# Patient Record
Sex: MALE | Race: WHITE
[De-identification: names, ages, dates, MRNs, and addresses within clinical notes are randomized per-mention and may not be internally consistent; named-entity substitution may affect disease eponyms.]

---

## 2017-01-11 ENCOUNTER — HOSPITAL ENCOUNTER (OUTPATIENT)
Age: 42
Discharge: HOME | End: 2017-01-11
Payer: COMMERCIAL

## 2017-01-11 DIAGNOSIS — Z01.812: Primary | ICD-10-CM

## 2017-01-11 DIAGNOSIS — M51.36: ICD-10-CM

## 2017-01-11 DIAGNOSIS — M51.36: Primary | ICD-10-CM

## 2017-01-11 DIAGNOSIS — E55.9: ICD-10-CM

## 2017-01-11 DIAGNOSIS — E11.9: ICD-10-CM

## 2017-01-11 DIAGNOSIS — Z79.899: ICD-10-CM

## 2017-06-25 ENCOUNTER — HOSPITAL ENCOUNTER (EMERGENCY)
Dept: HOSPITAL 76 - ED | Age: 42
Discharge: HOME | End: 2017-06-25
Payer: COMMERCIAL

## 2017-06-25 VITALS — DIASTOLIC BLOOD PRESSURE: 90 MMHG | SYSTOLIC BLOOD PRESSURE: 131 MMHG

## 2017-06-25 DIAGNOSIS — R03.0: ICD-10-CM

## 2017-06-25 DIAGNOSIS — Z98.890: ICD-10-CM

## 2017-06-25 DIAGNOSIS — M62.830: ICD-10-CM

## 2017-06-25 DIAGNOSIS — M54.5: Primary | ICD-10-CM

## 2017-06-25 DIAGNOSIS — E11.9: ICD-10-CM

## 2017-06-25 DIAGNOSIS — Z79.84: ICD-10-CM

## 2017-06-25 PROCEDURE — 99283 EMERGENCY DEPT VISIT LOW MDM: CPT

## 2017-06-25 NOTE — ED PHYSICIAN DOCUMENTATION
PD HPI BACK PAIN





- Stated complaint


Stated Complaint: LOW BACK PX





- Chief complaint


Chief Complaint: Back Pain





- History obtained from


History obtained from: Patient, Family





- History of Present Illness


Timing - onset: Yesterday


Timing - duration: Days (2)


Timing - details: Gradual onset


Pain level max: 8


Pain level now: 8


Location: Lower, Right, Left


Quality: Pain, Spasm, Similar to prior episodes


Associated symptoms: No: Fever, Weakness, Numbness, Incontinent of urine, 

Unable to urinate, Hematuria, Incontinent of stool


Improves with: Rest


Worsened by: Movement


Contributing factors: Twisting (doing laundry)


Similar symptoms before: Diagnosis (back pain)


Recently seen: Surgery (laminectomy in the L spine several months ago)





Review of Systems


Constitutional: denies: Fever


GI: denies: Abdominal Pain, Nausea, Vomiting, Diarrhea


: denies: Incontinent


Musculoskeletal: denies: Neck pain


Neurologic: denies: Focal weakness, Numbness, Headache





PD PAST MEDICAL HISTORY





- Past Medical History


Cardiovascular: None


Respiratory: None


Neuro: None


Endocrine/Autoimmune: Type 2 diabetes


GI: None


: None


HEENT: None


Psych: Depression


Musculoskeletal: None


Derm: None





- Past Surgical History


Past Surgical History: No


General: Gastric surgery





- Present Medications


Home Medications: 


 Ambulatory Orders











 Medication  Instructions  Recorded  Confirmed


 


Metformin HCl 500 mg PO DAILY 03/15/15 06/25/17


 


Buspirone HCl 7.5 mg PO DAILY 06/25/17 06/25/17


 


Cyclobenzaprine [Flexeril] 10 mg PO TID PRN 06/25/17 06/25/17


 


Oxycodone HCl/Acetaminophen 1 - 2 each PO Q6H PRN #14 tablet 06/25/17 





[Percocet 5-325 mg Tablet]   


 


Sertraline HCl 100 mg PO DAILY 06/25/17 06/25/17


 


predniSONE [Deltasone] 0 mg PO DAILY 06/25/17 06/25/17














- Allergies


Allergies/Adverse Reactions: 


 Allergies











Allergy/AdvReac Type Severity Reaction Status Date / Time


 


No Known Drug Allergies Allergy   Verified 06/25/17 18:11














- Social History


Does the pt smoke?: No


Smoking Status: Never smoker


Does the pt drink ETOH?: No


Does the pt have substance abuse?: No





- Immunizations


Immunizations are current?: Yes





- POLST


Patient has POLST: No





PD ED PE NORMAL





- Vitals


Vital signs reviewed: Yes





- General


General: Alert and oriented X 3, No acute distress





- HEENT


HEENT: Moist mucous membranes





- Neck


Neck: Supple, no meningeal sign, No bony TTP





- Cardiac


Cardiac: RRR





- Respiratory


Respiratory: No respiratory distress, Clear bilaterally





- Abdomen


Abdomen: Soft, Non tender, Non distended





- Back


Back: Other (No midline tenderness to palpation.  There is paraspinal muscle 

spasm present bilaterally low lumbar.)





- Derm


Derm: Warm and dry





- Extremities


Extremities: Other (normal bilateral lower extremity patellar and ankle jerk 

reflexes. Normal great toe extension bilaterally)





- Neuro


Neuro: Alert and oriented X 3, No motor deficit, No sensory deficit





- Psych


Psych: Normal mood, Normal affect





Results





- Vitals


Vitals: 


 Vital Signs - 24 hr











  06/25/17 06/25/17





  17:49 19:03


 


Temperature 36.7 C 36.6 C


 


Heart Rate 88 66


 


Respiratory 16 16





Rate  


 


Blood Pressure 139/78 H 131/90 H


 


O2 Saturation 98 98








 Oxygen











O2 Source                      Room air

















PD MEDICAL DECISION MAKING





- ED course


Complexity details: re-evaluated patient, considered differential (no cauda 

equina, no spinal epidural abscess, no fracture, no aortic dissection or 

evidence of aneursym rupture), d/w patient, d/w family


ED course: 





Patient is a 41-year-old male with a history of back pain status post 

laminectomy several months ago.  No acute findings on exam today other than 

paraspinal muscle spasm and tenderness.  Given dexamethasone here.  Placed on 

pain medications and muscle relaxants.  He is feeling better, able to sit up 

and ambulate in the emergency department.  No evidence of cauda equina.  

Patient counseled regarding signs and symptoms for which I believe and urgent re

-evaluation would be necessary. Patient with good understanding of and 

agreement to plan and is comfortable going home at this time





This document was made in part using voice recognition software. While efforts 

are made to proofread this document, sound alike and grammatical errors may 

occur.





Departure





- Departure


Disposition: 01 Home, Self Care


Clinical Impression: 


Back pain


Qualifiers:


 Back pain location: low back pain Chronicity: acute Back pain laterality: 

bilateral Sciatica presence: without sciatica Qualified Code(s): M54.5 - Low 

back pain


Condition: Good


Instructions:  ED Neck Back Pain General


Follow-Up: 


Carrie Louis PA-C [Primary Care Provider] - Within 1 week


Prescriptions: 


Oxycodone HCl/Acetaminophen [Percocet 5-325 mg Tablet] 1 - 2 each PO Q6H PRN #

14 tablet


 PRN Reason: pain


Comments: 


Return if you worsen.  This should improve over the next few days.


Do not drink alcohol or drive while on narcotic pain medicine. 


Note that many narcotic pain relievers also contain tylenol/acetaminophen. 

Please ensure that your total dose of acetaminophen from all sources does not 

exceed 3 grams (3000mg) per day. 


You may constipated on this medication, take a stool softener such as "Colace" 

twice a day while you are on it. Also recommend a over-the-counter laxative 

such as senna or MiraLAX any day that you do not have a bowel movement. 


If you received narcotic pain medication in the emergency department, do not 

drive or operate machinery for the next 24 hours.








Your blood pressure was elevated today on check in to the emergency department. 

This does not mean that you have hypertension, it is a common phenomenon to 

check into the emergency department and have elevated blood pressure. I 

recommend that you see your primary care physician within the week to have it 

rechecked when you're feeling better.





Discharge Date/Time: 06/25/17 19:26

## 2017-07-06 ENCOUNTER — HOSPITAL ENCOUNTER (OUTPATIENT)
Dept: HOSPITAL 76 - LAB | Age: 42
Discharge: HOME | End: 2017-07-06
Attending: PHYSICIAN ASSISTANT
Payer: COMMERCIAL

## 2017-07-06 DIAGNOSIS — E11.9: Primary | ICD-10-CM

## 2017-07-06 DIAGNOSIS — Z79.899: ICD-10-CM

## 2017-07-06 DIAGNOSIS — E55.9: ICD-10-CM

## 2017-07-06 DIAGNOSIS — M54.9: ICD-10-CM

## 2017-07-06 LAB
ALBUMIN/GLOB SERPL: 1.4 {RATIO} (ref 1–2.2)
ANION GAP SERPL CALCULATED.4IONS-SCNC: 9 MMOL/L (ref 6–13)
BASOPHILS NFR BLD AUTO: 0 10^3/UL (ref 0–0.1)
BASOPHILS NFR BLD AUTO: 0.1 %
BILIRUB BLD-MCNC: 1.6 MG/DL (ref 0.2–1)
BUN SERPL-MCNC: 15 MG/DL (ref 6–20)
CALCIUM UR-MCNC: 9.4 MG/DL (ref 8.5–10.3)
CHLORIDE SERPL-SCNC: 98 MMOL/L (ref 101–111)
CO2 SERPL-SCNC: 29 MMOL/L (ref 21–32)
CREAT SERPLBLD-SCNC: 0.9 MG/DL (ref 0.6–1.2)
EOSINOPHIL # BLD AUTO: 0 10^3/UL (ref 0–0.7)
EOSINOPHIL NFR BLD AUTO: 0.3 %
ERYTHROCYTE [DISTWIDTH] IN BLOOD BY AUTOMATED COUNT: 13.7 % (ref 12–15)
EST. AVERAGE GLUCOSE BLD GHB EST-MCNC: 160 MG/DL (ref 70–100)
GFRSERPLBLD MDRD-ARVRAT: 92 ML/MIN/{1.73_M2} (ref 89–?)
GLOBULIN SER-MCNC: 3 G/DL (ref 2.1–4.2)
GLUCOSE SERPL-MCNC: 227 MG/DL (ref 70–100)
HBA1C BLD-MCNC: 0.93 G/DL
HCT VFR BLD AUTO: 45.8 % (ref 42–52)
HGB UR QL STRIP: 15.8 G/DL (ref 14–18)
LYMPHOCYTES # SPEC AUTO: 0.7 10^3/UL (ref 1.5–3.5)
LYMPHOCYTES NFR BLD AUTO: 6.8 %
MCH RBC QN AUTO: 29.4 PG (ref 27–31)
MCHC RBC AUTO-ENTMCNC: 34.6 G/DL (ref 32–36)
MCV RBC AUTO: 85 FL (ref 80–94)
MONOCYTES # BLD AUTO: 0.7 10^3/UL (ref 0–1)
MONOCYTES NFR BLD AUTO: 7.3 %
NEUTROPHILS # BLD AUTO: 8.4 10^3/UL (ref 1.5–6.6)
NEUTROPHILS # SNV AUTO: 9.8 X10^3/UL (ref 4.8–10.8)
NEUTROPHILS NFR BLD AUTO: 85.5 %
NRBC # BLD AUTO: 0 /100WBC
PDW BLD AUTO: 8.4 FL (ref 7.4–11.4)
POTASSIUM SERPL-SCNC: 3.9 MMOL/L (ref 3.5–5)
PROT SPEC-MCNC: 7.2 G/DL (ref 6.7–8.2)
RBC MAR: 5.38 10^6/UL (ref 4.7–6.1)
SODIUM SERPLBLD-SCNC: 136 MMOL/L (ref 135–145)
WBC # BLD: 9.8 X10^3/UL

## 2017-07-06 PROCEDURE — 36415 COLL VENOUS BLD VENIPUNCTURE: CPT

## 2017-07-06 PROCEDURE — 85025 COMPLETE CBC W/AUTO DIFF WBC: CPT

## 2017-07-06 PROCEDURE — 82306 VITAMIN D 25 HYDROXY: CPT

## 2017-07-06 PROCEDURE — 80053 COMPREHEN METABOLIC PANEL: CPT

## 2017-07-06 PROCEDURE — 85651 RBC SED RATE NONAUTOMATED: CPT

## 2017-07-06 PROCEDURE — 86140 C-REACTIVE PROTEIN: CPT

## 2017-07-06 PROCEDURE — 83036 HEMOGLOBIN GLYCOSYLATED A1C: CPT

## 2017-10-06 ENCOUNTER — HOSPITAL ENCOUNTER (OUTPATIENT)
Dept: HOSPITAL 76 - DI | Age: 42
Discharge: HOME | End: 2017-10-06
Attending: PHYSICIAN ASSISTANT
Payer: COMMERCIAL

## 2017-10-06 DIAGNOSIS — M51.36: Primary | ICD-10-CM

## 2017-10-06 PROCEDURE — 72110 X-RAY EXAM L-2 SPINE 4/>VWS: CPT

## 2017-10-06 NOTE — XRAY REPORT
COMPLETE LUMBAR SPINE:  10/06/2017

 

CLINICAL INDICATION:  Increasing back pain, history of surgery.  

 

FINDINGS:  AP, lateral, oblique, coned-down views of the lumbar spine are compared to previous films 
of 01/11/2017.  Degenerative disk disease appears stable.  Postoperative changes are noted in the pos
terior elements of L4.  There is no evidence of compression fracture or subluxation.  

 

IMPRESSION:  POSTOPERATIVE CHANGES.  STABLE DEGENERATIVE CHANGES.  NO EVIDENCE OF INTERVAL FRACTURE. 
 

 

 

 

DD:10/06/2017 9:06:15  DT: 10/06/2017 09:14  JOB #: [id number]  EXT JOB #:[id number]

## 2017-10-24 ENCOUNTER — HOSPITAL ENCOUNTER (OUTPATIENT)
Dept: HOSPITAL 76 - LAB | Age: 42
Discharge: HOME | End: 2017-10-24
Attending: PHYSICIAN ASSISTANT
Payer: COMMERCIAL

## 2017-10-24 DIAGNOSIS — Z00.00: Primary | ICD-10-CM

## 2017-10-24 DIAGNOSIS — Z79.899: ICD-10-CM

## 2017-10-24 DIAGNOSIS — M48.061: ICD-10-CM

## 2017-10-24 DIAGNOSIS — E11.9: ICD-10-CM

## 2017-10-24 DIAGNOSIS — M25.78: ICD-10-CM

## 2017-10-24 DIAGNOSIS — M51.26: ICD-10-CM

## 2017-10-24 LAB
ALBUMIN/GLOB SERPL: 1.6 {RATIO} (ref 1–2.2)
ANION GAP SERPL CALCULATED.4IONS-SCNC: 10 MMOL/L (ref 6–13)
BASOPHILS NFR BLD AUTO: 0 10^3/UL (ref 0–0.1)
BASOPHILS NFR BLD AUTO: 0.3 %
BILIRUB BLD-MCNC: 1.8 MG/DL (ref 0.2–1)
BUN SERPL-MCNC: 16 MG/DL (ref 6–20)
CALCIUM UR-MCNC: 9.4 MG/DL (ref 8.5–10.3)
CHLORIDE SERPL-SCNC: 103 MMOL/L (ref 101–111)
CHOLEST SERPL-MCNC: 210 MG/DL
CO2 SERPL-SCNC: 29 MMOL/L (ref 21–32)
CREAT SERPLBLD-SCNC: 1 MG/DL (ref 0.6–1.2)
EOSINOPHIL # BLD AUTO: 0 10^3/UL (ref 0–0.7)
EOSINOPHIL NFR BLD AUTO: 0.6 %
ERYTHROCYTE [DISTWIDTH] IN BLOOD BY AUTOMATED COUNT: 13.8 % (ref 12–15)
EST. AVERAGE GLUCOSE BLD GHB EST-MCNC: 163 MG/DL (ref 70–100)
GFRSERPLBLD MDRD-ARVRAT: 81 ML/MIN/{1.73_M2} (ref 89–?)
GLOBULIN SER-MCNC: 2.8 G/DL (ref 2.1–4.2)
GLUCOSE SERPL-MCNC: 122 MG/DL (ref 70–100)
HBA1C BLD-MCNC: 0.95 G/DL
HCT VFR BLD AUTO: 45.4 % (ref 42–52)
HDLC SERPL-MCNC: 36 MG/DL
HDLC SERPL: 5.8 {RATIO} (ref ?–5)
HGB UR QL STRIP: 15.2 G/DL (ref 14–18)
LDLC/HDLC SERPL: 4.1 {RATIO} (ref ?–3.6)
LYMPHOCYTES # SPEC AUTO: 2.5 10^3/UL (ref 1.5–3.5)
LYMPHOCYTES NFR BLD AUTO: 31 %
MCH RBC QN AUTO: 29.3 PG (ref 27–31)
MCHC RBC AUTO-ENTMCNC: 33.4 G/DL (ref 32–36)
MCV RBC AUTO: 87.9 FL (ref 80–94)
MONOCYTES # BLD AUTO: 0.6 10^3/UL (ref 0–1)
MONOCYTES NFR BLD AUTO: 8.1 %
NEUTROPHILS # BLD AUTO: 4.8 10^3/UL (ref 1.5–6.6)
NEUTROPHILS # SNV AUTO: 8 X10^3/UL (ref 4.8–10.8)
NEUTROPHILS NFR BLD AUTO: 60 %
NRBC # BLD AUTO: 0 /100WBC
PDW BLD AUTO: 8.3 FL (ref 7.4–11.4)
POTASSIUM SERPL-SCNC: 3.4 MMOL/L (ref 3.5–5)
PROT SPEC-MCNC: 7.2 G/DL (ref 6.7–8.2)
RBC MAR: 5.17 10^6/UL (ref 4.7–6.1)
SODIUM SERPLBLD-SCNC: 142 MMOL/L (ref 135–145)
TRIGL P FAST SERPL-MCNC: 127 MG/DL
VLDLC SERPL-SCNC: 25 MG/DL
WBC # BLD: 8 X10^3/UL

## 2017-10-24 PROCEDURE — 85025 COMPLETE CBC W/AUTO DIFF WBC: CPT

## 2017-10-24 PROCEDURE — 36415 COLL VENOUS BLD VENIPUNCTURE: CPT

## 2017-10-24 PROCEDURE — 80061 LIPID PANEL: CPT

## 2017-10-24 PROCEDURE — 82306 VITAMIN D 25 HYDROXY: CPT

## 2017-10-24 PROCEDURE — 80053 COMPREHEN METABOLIC PANEL: CPT

## 2017-10-24 PROCEDURE — 84443 ASSAY THYROID STIM HORMONE: CPT

## 2017-10-24 PROCEDURE — 72158 MRI LUMBAR SPINE W/O & W/DYE: CPT

## 2017-10-24 PROCEDURE — 82043 UR ALBUMIN QUANTITATIVE: CPT

## 2017-10-24 PROCEDURE — 83036 HEMOGLOBIN GLYCOSYLATED A1C: CPT

## 2017-10-24 NOTE — MRI REPORT
EXAM:

MRI LUMBAR SPINE WITHOUT AND WITH CONTRAST

 

EXAM DATE: 10/24/2017 08:16 AM.

 

CLINICAL HISTORY: Back pain, lumbar, chronic.

 

COMPARISONS: MRI of the lumbar spine without contrast 07/10/2014.

 

TECHNIQUE: Multiplanar, multisequence T1-weighted and fluid-sensitive sequences of the lumbar spine f
rom T12 to S1 before and after administration of intravenous contrast. IV contrast: 10 mL Gadavist. O
ther: None.

 

FINDINGS: 

 

There is straightening of the normal lumbar lordosis. There is a mild decrease in the height of the d
isk at L3-L4, mild to moderate at L4-L5. There is desiccation of the disk spaces of a mild degree fro
m L2-L3 through L5-S1.

 

The conus terminates at T12-L1 and is normal.

 

The kidneys are without evidence of hydronephrosis. The abdominal aorta is of normal caliber. There i
s no significant atrophy of the paraspinal musculature or the psoas musculature.

 

L2-L3: There is a small disk bulge with a left paracentral extrusion of the disk with annular tear pr
oducing a mild central canal stenosis. The facets are normal. There is mild narrowing of the left elton
ral foramen. There has been slight retraction of the extrusion.

 

L3-L4: There is a small disk osteophyte complex with annular tear abutting the sac with superimposed 
central extrusion of the disk producing a mild central canal stenosis. The facets are normal. There i
s mild to moderate right and moderate left neural foraminal narrowing.

 

L4-L5: There are surgical changes of a right hemilaminotomy. There is a broad-based disk osteophyte c
omplex with a superimposed central extrusion of the disk. It is eccentric to the right. This produces
 moderate to severe right foraminal stenosis with contact and questionable compression of the exiting
 right L4 nerve root. Recommend correlation for right L4 radicular symptoms. Previously there was mod
erate narrowing of the right neural foramen. There is adequate CSF surrounding the nerve roots of the
 cauda equina. There is minimal left and right facet arthropathy. There is mild T2 hyperintensity wit
hin the L4-L5 inner vertebral disk space and the L4-L5 endplates likely representing annular tear wit
hin the L4-L5 disk space with concomitant Modic type I endplate degenerative change.

 

L5-S1: There is a small disk bulge with annular tear abutting the sac without significant central can
al stenosis. The facets are normal. There is no significant foraminal stenosis. 

 

The postcontrast T1-weighted images exhibit enhancement within the dorsum of the L4-L5 disk space (8,
 801). There is also T1 hypointensity within the right anterior epidural space and right hemilaminoto
my defect which exhibits enhancement. This likely represents changes of brenda dural fibrosis. Recommen
d correlation. There is a small amount of enhancement within the paraspinal musculature representing 
a small amount residual inflammation.

 

IMPRESSION: 

1. There are surgical changes of a right hemilaminotomy at L4-L5 for microdiskectomy. There is now ad
equate CSF surrounding the nerve roots of the cauda equina. However, there has been progression of th
e right foraminal stenosis which is now moderate to severe and may potentially compress the exiting r
ight L4 nerve root. Recommend correlation for any right L4 radicular symptoms. There is enhancement d
emonstrated within the posterior aspect of the L4-L5 intervertebral disk space which could be seconda
ry to associated inflammation from recurrent annular tear. Residual postsurgical enhancement is possi
ble. Other etiologies such as infection would overall be somewhat less likely but not entirely exclud
ed. 

2. There is a small disk osteophyte complex at L3-L4 with annular tear abutting the sac with superimp
osed central extrusion of the disk producing a mild central canal stenosis. 

3. There is a small disk bulge with a left paracentral extrusion of the disk at L2-L3 producing a mil
d central canal stenosis. There has been slight retraction of the extrusion at this level.

 

 

Comment: The following findings are so common in adults without low back pain that while we report th
eir presence, they must be interpreted with caution and in the context of the clinical situation. (Re
vera Giron et al, Spine 2001)

 

Prevalence of findings in patients without low back pain:

Disk degeneration (any evidence): 92%

Disk desiccation/T2 signal loss: 83%

Disk height loss: 56%

Disk bulge: 64%

Disk protrusion: 32%

Annular tear/high intensity zone: 38%

 

[facility]

Referring Provider Line: [phone]

 

SITE ID: [id number]

## 2017-10-24 NOTE — MRI PRELIMINARY REPORT
Accession: [id number]

Exam: MRI LUMBAR SPINE W/WO

 

Impression: 

1. There are surgical changes of the right hemilaminotomy at L4-L5 for microdiskectomy. There is now 
adequate CSF surrounding the nerve roots of the cauda equina area however there has been progression 
of the right foraminal stenosis which is now moderate to severe and they potentially compress the exi
ting right L4 nerve root. Recommend correlation for any right L4 reticular symptoms. There is enhance
ment demonstrated within the posterior aspect of the L4-L5 intervertebral disk space which could be s
econdary to associated inflammation from recurrent annular tear. Residual postsurgical enhancement is
 possible. Other etiologies such as infection would overall be somewhat less likely but not entirely 
excluded. 

2. There is a small disk osteophyte complex at L3-L4 with annular tear abutting the sacral superimpos
ed central extrusion of the disk producing a mild central canal stenosis. 

3. There is a small disk bulge with a left paracentral extrusion of the disk at L2-L3 producing a mil
d central canal stenosis. There has been slight retraction of the extrusion at this level area

 

 

Comment: The following findings are so common in adults without low back pain that while we report th
eir presence, they must be interpreted with caution and in the context of the clinical situation. (Re
vera Giron et al, Spine 2001)

 

Prevalence of findings in patients without low back pain:

Disk degeneration (any evidence): 92%

Disk desiccation/T2 signal loss: 83%

Disk height loss: 56%

Disk bulge: 64%

Disk protrusion: 32%

Annular tear/high intensity zone: 38%

 

[facility]

 

SITE ID: [id number]

## 2018-01-29 ENCOUNTER — HOSPITAL ENCOUNTER (OUTPATIENT)
Dept: HOSPITAL 76 - LAB.R | Age: 43
Discharge: HOME | End: 2018-01-29
Attending: PHYSICIAN ASSISTANT
Payer: COMMERCIAL

## 2018-01-29 DIAGNOSIS — Z01.812: Primary | ICD-10-CM

## 2018-01-29 DIAGNOSIS — Z79.899: ICD-10-CM

## 2018-01-29 DIAGNOSIS — E11.9: ICD-10-CM

## 2018-01-29 LAB
ALBUMIN DIAFP-MCNC: 4.4 G/DL (ref 3.2–5.5)
ALBUMIN/GLOB SERPL: 1.6 {RATIO} (ref 1–2.2)
ALP SERPL-CCNC: 66 IU/L (ref 42–121)
ALT SERPL W P-5'-P-CCNC: 25 IU/L (ref 10–60)
ANION GAP SERPL CALCULATED.4IONS-SCNC: 7 MMOL/L (ref 6–13)
AST SERPL W P-5'-P-CCNC: 25 IU/L (ref 10–42)
BASOPHILS NFR BLD AUTO: 0 10^3/UL (ref 0–0.1)
BASOPHILS NFR BLD AUTO: 0.7 %
BILIRUB BLD-MCNC: 1.2 MG/DL (ref 0.2–1)
BUN SERPL-MCNC: 18 MG/DL (ref 6–20)
CALCIUM UR-MCNC: 9.2 MG/DL (ref 8.5–10.3)
CHLORIDE SERPL-SCNC: 104 MMOL/L (ref 101–111)
CO2 SERPL-SCNC: 27 MMOL/L (ref 21–32)
CREAT SERPLBLD-SCNC: 0.8 MG/DL (ref 0.6–1.2)
EOSINOPHIL # BLD AUTO: 0.1 10^3/UL (ref 0–0.7)
EOSINOPHIL NFR BLD AUTO: 1 %
ERYTHROCYTE [DISTWIDTH] IN BLOOD BY AUTOMATED COUNT: 14 % (ref 12–15)
EST. AVERAGE GLUCOSE BLD GHB EST-MCNC: 128 MG/DL (ref 70–100)
GFRSERPLBLD MDRD-ARVRAT: 105 ML/MIN/{1.73_M2} (ref 89–?)
GLOBULIN SER-MCNC: 2.8 G/DL (ref 2.1–4.2)
GLUCOSE SERPL-MCNC: 104 MG/DL (ref 70–100)
HB2 TOTAL: 17.1 G/DL
HBA1C BLD-MCNC: 0.73 G/DL
HEMOGLOBIN A1C %: 6.1 % (ref 4.6–6.2)
HGB UR QL STRIP: 15.2 G/DL (ref 14–18)
LYMPHOCYTES # SPEC AUTO: 1.2 10^3/UL (ref 1.5–3.5)
LYMPHOCYTES NFR BLD AUTO: 19.1 %
MCH RBC QN AUTO: 29.3 PG (ref 27–31)
MCHC RBC AUTO-ENTMCNC: 35.1 G/DL (ref 32–36)
MCV RBC AUTO: 83.5 FL (ref 80–94)
MONOCYTES # BLD AUTO: 0.5 10^3/UL (ref 0–1)
MONOCYTES NFR BLD AUTO: 7.3 %
NEUTROPHILS # BLD AUTO: 4.6 10^3/UL (ref 1.5–6.6)
NEUTROPHILS # SNV AUTO: 6.5 X10^3/UL (ref 4.8–10.8)
NEUTROPHILS NFR BLD AUTO: 71.9 %
PDW BLD AUTO: 8.6 FL (ref 7.4–11.4)
PLATELET # BLD: 185 10^3/UL (ref 130–450)
PROT SPEC-MCNC: 7.2 G/DL (ref 6.7–8.2)
RBC MAR: 5.19 10^6/UL (ref 4.7–6.1)
SODIUM SERPLBLD-SCNC: 138 MMOL/L (ref 135–145)

## 2018-01-29 PROCEDURE — 80053 COMPREHEN METABOLIC PANEL: CPT

## 2018-01-29 PROCEDURE — 85025 COMPLETE CBC W/AUTO DIFF WBC: CPT

## 2018-01-29 PROCEDURE — 87640 STAPH A DNA AMP PROBE: CPT

## 2018-01-29 PROCEDURE — 83036 HEMOGLOBIN GLYCOSYLATED A1C: CPT

## 2018-04-02 ENCOUNTER — HOSPITAL ENCOUNTER (OUTPATIENT)
Dept: HOSPITAL 76 - DI | Age: 43
Discharge: HOME | End: 2018-04-02
Attending: PHYSICIAN ASSISTANT
Payer: COMMERCIAL

## 2018-04-02 DIAGNOSIS — R51: ICD-10-CM

## 2018-04-02 DIAGNOSIS — G93.9: Primary | ICD-10-CM

## 2018-04-02 PROCEDURE — 70544 MR ANGIOGRAPHY HEAD W/O DYE: CPT

## 2018-04-02 PROCEDURE — 70551 MRI BRAIN STEM W/O DYE: CPT

## 2018-04-02 NOTE — MRI REPORT
EXAM:

MRI BRAIN WITHOUT CONTRAST

 

EXAM DATE: 4/2/2018 02:09 PM.

 

CLINICAL HISTORY: Headache.

 

COMPARISON: None. MRA of the head 04/02/2018.

 

TECHNIQUE: Multiplanar, multisequence T1-weighted and fluid-sensitive MR sequences of the brain were 
performed. Sequences optimized for routine evaluation. Other: None. IV Contrast: None.

 

FINDINGS:

Brain Volume: Normal for age.

 

Parenchyma/Dura: No mass, acute infarct or hemorrhage. Lobular septated 10 x 4 mm cystic region is se
en within the subependymal region lateral and superior lateral to the frontal horn of the left latera
l ventricle. This is peripheral to the head of the left caudate nucleus. No white matter lesions iden
tified. 

 

Ventricles/Cisterns: No hydrocephalus. No abnormal extra-axial fluid collection or hemorrhage.

 

Orbits: Symmetric and unremarkable.

 

Sella Turcica: The pituitary gland, cavernous sinuses, suprasellar cistern and optic chiasm are unrem
arkable.

 

IAC: Symmetric and unremarkable.

 

Vasculature: Normal signal flow void is seen in the major arterial structures at the skull base.

 

Sinuses: No acute appearing sinus disease.

 

Bones: No focal pathologic appearing marrow signal changes.

 

Other: None.

 

IMPRESSION: 

1. Lobular 10 x 4 mm septated cystic focus within a subependymal location about the frontal horn of t
he left lateral ventricle. This is peripheral to the anterior head of the caudate nucleus. This likel
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 Follow-up study
 in 3-6 months may be of value to assess stability.

 

2. No acute intracranial abnormality. No acute infarct, mass or hemorrhage.

 

[facility]

Referring Provider Line: [phone]

 

SITE ID: [id number]

## 2018-04-02 NOTE — MRI REPORT
EXAM

MRA BRAIN 

 

EXAM DATE: 4/2/2018 02:09 PM.

 

CLINICAL HISTORY: HEADACHE..

 

COMPARISON: None. MRI of the brain 04/02/2018.

 

TECHNIQUE: Multiplanar, multisequence MRA sequences of the brain were performed. Other: None. Post-pr
ocessing: Multiplanar 3D MIP reconstructions. IV Contrast: None.

 

FINDINGS:

RIGHT

Internal Carotid (ICA): No aneurysm, stenosis or anomaly.

Middle Cerebral (MCA): No aneurysm, stenosis or anomaly.

Anterior Cerebral (CLARICE): No aneurysm, stenosis or anomaly.

Posterior Cerebral (PCA): No aneurysm, stenosis or anomaly.

Posterior Communicating (P-COM): Not visualized.

 

Vertebral: No aneurysm, stenosis or anomaly in the visualized upper vertebral artery. The PICA is unr
emarkable.

 

LEFT

Internal Carotid (ICA): No aneurysm, stenosis or anomaly.

Middle Cerebral (MCA): No aneurysm, stenosis or anomaly.

Anterior Cerebral (CLARICE): No aneurysm, stenosis or anomaly.

Posterior Cerebral (PCA): No aneurysm, stenosis or anomaly.

Posterior Communicating (P-COM): Not visualized.

 

Vertebral: No aneurysm, stenosis or anomaly in the visualized upper vertebral artery. The PICA is unr
emarkable.

 

MIDLINE

Anterior Communicating (A-COM): No aneurysm, stenosis or anomaly.

Basilar Artery:No aneurysm, stenosis or anomaly.

 

Other: None.

 

IMPRESSION: 

1. Normal brain MRA. No stenoses or aneurysms.

 

[facility]

Referring Provider Line: [phone]

 

SITE ID: [id number]

## 2018-11-08 ENCOUNTER — HOSPITAL ENCOUNTER (OUTPATIENT)
Dept: HOSPITAL 76 - LAB | Age: 43
Discharge: HOME | End: 2018-11-08
Attending: PHYSICIAN ASSISTANT
Payer: COMMERCIAL

## 2018-11-08 DIAGNOSIS — M54.5: ICD-10-CM

## 2018-11-08 DIAGNOSIS — E55.9: ICD-10-CM

## 2018-11-08 DIAGNOSIS — Z00.00: Primary | ICD-10-CM

## 2018-11-08 DIAGNOSIS — R35.0: ICD-10-CM

## 2018-11-08 DIAGNOSIS — Z79.899: ICD-10-CM

## 2018-11-08 DIAGNOSIS — E11.9: ICD-10-CM

## 2018-11-08 LAB
ALBUMIN DIAFP-MCNC: 4.5 G/DL (ref 3.2–5.5)
ALBUMIN/GLOB SERPL: 1.6 {RATIO} (ref 1–2.2)
ALP SERPL-CCNC: 89 IU/L (ref 42–121)
ALT SERPL W P-5'-P-CCNC: 23 IU/L (ref 10–60)
ANION GAP SERPL CALCULATED.4IONS-SCNC: 9 MMOL/L (ref 6–13)
AST SERPL W P-5'-P-CCNC: 26 IU/L (ref 10–42)
BASOPHILS NFR BLD AUTO: 0 10^3/UL (ref 0–0.1)
BASOPHILS NFR BLD AUTO: 0.4 %
BILIRUB BLD-MCNC: 2.1 MG/DL (ref 0.2–1)
BILIRUB UR QL CFM: NEGATIVE
BUN SERPL-MCNC: 14 MG/DL (ref 6–20)
CALCIUM UR-MCNC: 9.3 MG/DL (ref 8.5–10.3)
CASTS URNS QL MICRO: (no result) /LPF
CHLORIDE SERPL-SCNC: 100 MMOL/L (ref 101–111)
CHOLEST SERPL-MCNC: 226 MG/DL
CLARITY UR REFRACT.AUTO: CLEAR
CO2 SERPL-SCNC: 28 MMOL/L (ref 21–32)
CREAT SERPLBLD-SCNC: 0.7 MG/DL (ref 0.6–1.2)
CRP SERPL-MCNC: < 1 MG/DL (ref 0–1)
EOSINOPHIL # BLD AUTO: 0.1 10^3/UL (ref 0–0.7)
EOSINOPHIL NFR BLD AUTO: 1.2 %
ERYTHROCYTE [DISTWIDTH] IN BLOOD BY AUTOMATED COUNT: 13.7 % (ref 12–15)
EST. AVERAGE GLUCOSE BLD GHB EST-MCNC: 163 MG/DL (ref 70–100)
GFRSERPLBLD MDRD-ARVRAT: 122 ML/MIN/{1.73_M2} (ref 89–?)
GLOBULIN SER-MCNC: 2.9 G/DL (ref 2.1–4.2)
GLUCOSE SERPL-MCNC: 177 MG/DL (ref 70–100)
GLUCOSE UR QL STRIP.AUTO: 100 MG/DL
HB2 TOTAL: 17 G/DL
HBA1C BLD-MCNC: 0.96 G/DL
HDLC SERPL-MCNC: 34 MG/DL
HDLC SERPL: 6.6 {RATIO} (ref ?–5)
HEMOGLOBIN A1C %: 7.3 % (ref 4.6–6.2)
HGB UR QL STRIP: 15 G/DL (ref 14–18)
KETONES UR QL STRIP.AUTO: (no result) MG/DL
LDLC SERPL CALC-MCNC: 152 MG/DL
LDLC/HDLC SERPL: 4.5 {RATIO} (ref ?–3.6)
LYMPHOCYTES # SPEC AUTO: 1.1 10^3/UL (ref 1.5–3.5)
LYMPHOCYTES NFR BLD AUTO: 22.2 %
MCH RBC QN AUTO: 30.3 PG (ref 27–31)
MCHC RBC AUTO-ENTMCNC: 35.1 G/DL (ref 32–36)
MCV RBC AUTO: 86.4 FL (ref 80–94)
MONOCYTES # BLD AUTO: 0.4 10^3/UL (ref 0–1)
MONOCYTES NFR BLD AUTO: 8.3 %
MUCOUS THREADS URNS QL MICRO: (no result)
NEUTROPHILS # BLD AUTO: 3.3 10^3/UL (ref 1.5–6.6)
NEUTROPHILS # SNV AUTO: 4.9 X10^3/UL (ref 4.8–10.8)
NEUTROPHILS NFR BLD AUTO: 67.9 %
NITRITE UR QL STRIP.AUTO: NEGATIVE
PDW BLD AUTO: 8.1 FL (ref 7.4–11.4)
PH UR STRIP.AUTO: 5.5 PH (ref 5–7.5)
PLATELET # BLD: 197 10^3/UL (ref 130–450)
PROT SPEC-MCNC: 7.4 G/DL (ref 6.7–8.2)
PROT UR STRIP.AUTO-MCNC: 100 MG/DL
RBC # UR STRIP.AUTO: NEGATIVE /UL
RBC # URNS HPF: (no result) /HPF (ref 0–5)
RBC MAR: 4.95 10^6/UL (ref 4.7–6.1)
SODIUM SERPLBLD-SCNC: 137 MMOL/L (ref 135–145)
SP GR UR STRIP.AUTO: >=1.03 (ref 1–1.03)
SQUAMOUS URNS QL MICRO: (no result)
STARCH GRANULES URNS QL MICRO: PRESENT
UROBILINOGEN UR QL STRIP.AUTO: (no result) E.U./DL
UROBILINOGEN UR STRIP.AUTO-MCNC: NEGATIVE MG/DL
VLDLC SERPL-SCNC: 40 MG/DL

## 2018-11-08 PROCEDURE — 84443 ASSAY THYROID STIM HORMONE: CPT

## 2018-11-08 PROCEDURE — 36415 COLL VENOUS BLD VENIPUNCTURE: CPT

## 2018-11-08 PROCEDURE — 87086 URINE CULTURE/COLONY COUNT: CPT

## 2018-11-08 PROCEDURE — 81001 URINALYSIS AUTO W/SCOPE: CPT

## 2018-11-08 PROCEDURE — 83036 HEMOGLOBIN GLYCOSYLATED A1C: CPT

## 2018-11-08 PROCEDURE — 86140 C-REACTIVE PROTEIN: CPT

## 2018-11-08 PROCEDURE — 85025 COMPLETE CBC W/AUTO DIFF WBC: CPT

## 2018-11-08 PROCEDURE — 82043 UR ALBUMIN QUANTITATIVE: CPT

## 2018-11-08 PROCEDURE — 80053 COMPREHEN METABOLIC PANEL: CPT

## 2018-11-08 PROCEDURE — 81003 URINALYSIS AUTO W/O SCOPE: CPT

## 2018-11-08 PROCEDURE — 80061 LIPID PANEL: CPT

## 2018-11-08 PROCEDURE — 82306 VITAMIN D 25 HYDROXY: CPT

## 2018-11-08 PROCEDURE — 83721 ASSAY OF BLOOD LIPOPROTEIN: CPT

## 2018-11-08 PROCEDURE — 85651 RBC SED RATE NONAUTOMATED: CPT

## 2019-01-10 ENCOUNTER — HOSPITAL ENCOUNTER (OUTPATIENT)
Dept: HOSPITAL 76 - LAB.R | Age: 44
Discharge: HOME | End: 2019-01-10
Attending: PHYSICIAN ASSISTANT
Payer: COMMERCIAL

## 2019-01-10 DIAGNOSIS — Z79.899: ICD-10-CM

## 2019-01-10 DIAGNOSIS — E11.9: Primary | ICD-10-CM

## 2019-01-10 LAB
EST. AVERAGE GLUCOSE BLD GHB EST-MCNC: 137 MG/DL (ref 70–100)
HB2 TOTAL: 16.1 G/DL
HBA1C BLD-MCNC: 0.74 G/DL
HEMOGLOBIN A1C %: 6.4 % (ref 4.6–6.2)

## 2019-01-10 PROCEDURE — 83036 HEMOGLOBIN GLYCOSYLATED A1C: CPT

## 2019-03-01 ENCOUNTER — HOSPITAL ENCOUNTER (OUTPATIENT)
Dept: HOSPITAL 76 - LAB.R | Age: 44
Discharge: HOME | End: 2019-03-01
Attending: FAMILY MEDICINE
Payer: COMMERCIAL

## 2019-03-01 DIAGNOSIS — J11.1: Primary | ICD-10-CM

## 2019-03-01 PROCEDURE — 87276 INFLUENZA A AG IF: CPT

## 2019-03-01 PROCEDURE — 87275 INFLUENZA B AG IF: CPT

## 2020-06-15 ENCOUNTER — HOSPITAL ENCOUNTER (OUTPATIENT)
Dept: HOSPITAL 76 - LAB | Age: 45
Discharge: HOME | End: 2020-06-15
Attending: ORTHOPAEDIC SURGERY
Payer: COMMERCIAL

## 2020-06-15 DIAGNOSIS — Z01.818: Primary | ICD-10-CM

## 2020-06-15 LAB
BUN SERPL-MCNC: 12 MG/DL (ref 6–20)
CREAT SERPLBLD-SCNC: 0.8 MG/DL (ref 0.6–1.2)

## 2020-06-15 PROCEDURE — 84520 ASSAY OF UREA NITROGEN: CPT

## 2020-06-15 PROCEDURE — 82565 ASSAY OF CREATININE: CPT

## 2020-06-15 PROCEDURE — 36415 COLL VENOUS BLD VENIPUNCTURE: CPT

## 2020-10-28 ENCOUNTER — HOSPITAL ENCOUNTER (OUTPATIENT)
Dept: HOSPITAL 76 - LAB.WCP | Age: 45
Discharge: HOME | End: 2020-10-28
Attending: FAMILY MEDICINE
Payer: COMMERCIAL

## 2020-10-28 DIAGNOSIS — E11.9: Primary | ICD-10-CM

## 2020-10-28 LAB
ALBUMIN DIAFP-MCNC: 4.6 G/DL (ref 3.2–5.5)
ALBUMIN/GLOB SERPL: 1.6 {RATIO} (ref 1–2.2)
ALP SERPL-CCNC: 56 IU/L (ref 42–121)
ALT SERPL W P-5'-P-CCNC: 21 IU/L (ref 10–60)
ANION GAP SERPL CALCULATED.4IONS-SCNC: 10 MMOL/L (ref 6–13)
AST SERPL W P-5'-P-CCNC: 24 IU/L (ref 10–42)
BASOPHILS NFR BLD AUTO: 0 10^3/UL (ref 0–0.1)
BASOPHILS NFR BLD AUTO: 0.4 %
BILIRUB BLD-MCNC: 1.5 MG/DL (ref 0.2–1)
BUN SERPL-MCNC: 11 MG/DL (ref 6–20)
CALCIUM UR-MCNC: 10.2 MG/DL (ref 8.5–10.3)
CHLORIDE SERPL-SCNC: 105 MMOL/L (ref 101–111)
CHOLEST SERPL-MCNC: 173 MG/DL
CO2 SERPL-SCNC: 27 MMOL/L (ref 21–32)
CREAT SERPLBLD-SCNC: 0.8 MG/DL (ref 0.6–1.2)
CREAT UR-SCNC: 359.8 MG/DL
EOSINOPHIL # BLD AUTO: 0.1 10^3/UL (ref 0–0.7)
EOSINOPHIL NFR BLD AUTO: 1 %
ERYTHROCYTE [DISTWIDTH] IN BLOOD BY AUTOMATED COUNT: 12.8 % (ref 12–15)
GLOBULIN SER-MCNC: 2.9 G/DL (ref 2.1–4.2)
GLUCOSE SERPL-MCNC: 118 MG/DL (ref 70–100)
HBA1C MFR BLD HPLC: 5.7 % (ref 4.27–6.07)
HDLC SERPL-MCNC: 40 MG/DL
HDLC SERPL: 4.3 {RATIO} (ref ?–5)
HGB UR QL STRIP: 14.5 G/DL (ref 14–18)
LDLC SERPL CALC-MCNC: 115 MG/DL
LDLC/HDLC SERPL: 2.9 {RATIO} (ref ?–3.6)
LYMPHOCYTES # SPEC AUTO: 1.5 10^3/UL (ref 1.5–3.5)
LYMPHOCYTES NFR BLD AUTO: 28 %
MCH RBC QN AUTO: 30.6 PG (ref 27–31)
MCHC RBC AUTO-ENTMCNC: 34.5 G/DL (ref 32–36)
MCV RBC AUTO: 88.6 FL (ref 80–94)
MICROALBUMIN UR-MCNC: 32.9 MG/DL (ref 0–300)
MICROALBUMIN/CREAT RATIO PNL UR: 91.4 UG/MG (ref ?–30)
MONOCYTES # BLD AUTO: 0.4 10^3/UL (ref 0–1)
MONOCYTES NFR BLD AUTO: 7.7 %
NEUTROPHILS # BLD AUTO: 3.3 10^3/UL (ref 1.5–6.6)
NEUTROPHILS # SNV AUTO: 5.2 X10^3/UL (ref 4.8–10.8)
NEUTROPHILS NFR BLD AUTO: 62.7 %
PDW BLD AUTO: 10.9 FL (ref 7.4–11.4)
PLATELET # BLD: 218 10^3/UL (ref 130–450)
PROT SPEC-MCNC: 7.5 G/DL (ref 6.7–8.2)
RBC MAR: 4.74 10^6/UL (ref 4.7–6.1)
SODIUM SERPLBLD-SCNC: 142 MMOL/L (ref 135–145)
VIT B12 SERPL-MCNC: 221 PG/ML (ref 180–914)
VLDLC SERPL-SCNC: 18 MG/DL

## 2020-10-28 PROCEDURE — 83721 ASSAY OF BLOOD LIPOPROTEIN: CPT

## 2020-10-28 PROCEDURE — 80053 COMPREHEN METABOLIC PANEL: CPT

## 2020-10-28 PROCEDURE — 83036 HEMOGLOBIN GLYCOSYLATED A1C: CPT

## 2020-10-28 PROCEDURE — 84443 ASSAY THYROID STIM HORMONE: CPT

## 2020-10-28 PROCEDURE — 80061 LIPID PANEL: CPT

## 2020-10-28 PROCEDURE — 82570 ASSAY OF URINE CREATININE: CPT

## 2020-10-28 PROCEDURE — 85025 COMPLETE CBC W/AUTO DIFF WBC: CPT

## 2020-10-28 PROCEDURE — 82043 UR ALBUMIN QUANTITATIVE: CPT

## 2020-10-28 PROCEDURE — 82607 VITAMIN B-12: CPT

## 2020-10-28 PROCEDURE — 36415 COLL VENOUS BLD VENIPUNCTURE: CPT

## 2020-11-06 ENCOUNTER — HOSPITAL ENCOUNTER (OUTPATIENT)
Dept: HOSPITAL 76 - DI | Age: 45
Discharge: HOME | End: 2020-11-06
Attending: FAMILY MEDICINE
Payer: COMMERCIAL

## 2020-11-06 DIAGNOSIS — E11.9: Primary | ICD-10-CM

## 2020-11-06 PROCEDURE — 93922 UPR/L XTREMITY ART 2 LEVELS: CPT

## 2020-11-06 NOTE — ULTRASOUND REPORT
PROCEDURE:  Ankle Brachial Index

 

INDICATIONS:  DIABETES MELLITUS

 

TECHNIQUE:  Ankle-brachial indices were obtained bilaterally and recorded.  

 

COMPARISONS:  None.

 

FINDINGS:  

Right ankle brachial index (EPIFANIO):  1.3

 

Left ankle brachial index (EPIFANIO):  1.2

 

 

Healing potential:  

Ankle pressures >55 mm Hg in non-diabetics and >80 mm Hg in diabetics are likely to achieve primary h
ealing of ischemic foot ulcers.  

Toe pressures >30 mm Hg are likely to achieve primary healing of ischemic foot ulcers, toe or transme
tatarsal amputations.  

 

IMPRESSION:  

Normal ankle-brachial indices.

 

Reviewed by: Kelly Recinos MD on 11/6/2020 9:54 AM PST

Approved by: Kelly Recinos MD on 11/6/2020 9:54 AM PST

 

 

Station ID:  [id number]

## 2021-02-17 ENCOUNTER — HOSPITAL ENCOUNTER (OUTPATIENT)
Dept: HOSPITAL 76 - LAB.N | Age: 46
Discharge: HOME | End: 2021-02-17
Attending: NURSE PRACTITIONER
Payer: COMMERCIAL

## 2021-02-17 DIAGNOSIS — R30.0: Primary | ICD-10-CM

## 2021-02-17 PROCEDURE — 87086 URINE CULTURE/COLONY COUNT: CPT

## 2021-02-18 ENCOUNTER — HOSPITAL ENCOUNTER (OUTPATIENT)
Dept: HOSPITAL 76 - LAB.WCP | Age: 46
End: 2021-02-18
Attending: FAMILY MEDICINE
Payer: COMMERCIAL

## 2021-02-18 DIAGNOSIS — N41.9: ICD-10-CM

## 2021-02-18 DIAGNOSIS — E11.9: Primary | ICD-10-CM

## 2021-02-18 LAB
ALBUMIN DIAFP-MCNC: 4.6 G/DL (ref 3.2–5.5)
ALBUMIN/GLOB SERPL: 1.8 {RATIO} (ref 1–2.2)
ALP SERPL-CCNC: 54 IU/L (ref 42–121)
ALT SERPL W P-5'-P-CCNC: 17 IU/L (ref 10–60)
ANION GAP SERPL CALCULATED.4IONS-SCNC: 10 MMOL/L (ref 6–13)
AST SERPL W P-5'-P-CCNC: 19 IU/L (ref 10–42)
BASOPHILS NFR BLD AUTO: 0 10^3/UL (ref 0–0.1)
BASOPHILS NFR BLD AUTO: 0.4 %
BILIRUB BLD-MCNC: 1.8 MG/DL (ref 0.2–1)
BUN SERPL-MCNC: 15 MG/DL (ref 6–20)
CALCIUM UR-MCNC: 9.6 MG/DL (ref 8.5–10.3)
CHLORIDE SERPL-SCNC: 100 MMOL/L (ref 101–111)
CHOLEST SERPL-MCNC: 187 MG/DL
CO2 SERPL-SCNC: 26 MMOL/L (ref 21–32)
CREAT SERPLBLD-SCNC: 1 MG/DL (ref 0.6–1.2)
EOSINOPHIL # BLD AUTO: 0 10^3/UL (ref 0–0.7)
EOSINOPHIL NFR BLD AUTO: 0.4 %
ERYTHROCYTE [DISTWIDTH] IN BLOOD BY AUTOMATED COUNT: 12.5 % (ref 12–15)
GLOBULIN SER-MCNC: 2.5 G/DL (ref 2.1–4.2)
GLUCOSE SERPL-MCNC: 120 MG/DL (ref 70–100)
HBA1C MFR BLD HPLC: 5.7 % (ref 4.27–6.07)
HDLC SERPL-MCNC: 42 MG/DL
HDLC SERPL: 4.5 {RATIO} (ref ?–5)
HGB UR QL STRIP: 14.7 G/DL (ref 14–18)
LDLC SERPL CALC-MCNC: 129 MG/DL
LDLC/HDLC SERPL: 3.1 {RATIO} (ref ?–3.6)
LYMPHOCYTES # SPEC AUTO: 1.4 10^3/UL (ref 1.5–3.5)
LYMPHOCYTES NFR BLD AUTO: 28.7 %
MCH RBC QN AUTO: 30.9 PG (ref 27–31)
MCHC RBC AUTO-ENTMCNC: 34.3 G/DL (ref 32–36)
MCV RBC AUTO: 90.3 FL (ref 80–94)
MONOCYTES # BLD AUTO: 0.4 10^3/UL (ref 0–1)
MONOCYTES NFR BLD AUTO: 8.2 %
NEUTROPHILS # BLD AUTO: 3.1 10^3/UL (ref 1.5–6.6)
NEUTROPHILS # SNV AUTO: 5 X10^3/UL (ref 4.8–10.8)
NEUTROPHILS NFR BLD AUTO: 62.1 %
PDW BLD AUTO: 10.9 FL (ref 7.4–11.4)
PLATELET # BLD: 208 10^3/UL (ref 130–450)
PROT SPEC-MCNC: 7.1 G/DL (ref 6.7–8.2)
RBC MAR: 4.75 10^6/UL (ref 4.7–6.1)
VLDLC SERPL-SCNC: 16 MG/DL

## 2021-02-18 PROCEDURE — 84153 ASSAY OF PSA TOTAL: CPT

## 2021-02-18 PROCEDURE — 80053 COMPREHEN METABOLIC PANEL: CPT

## 2021-02-18 PROCEDURE — 80061 LIPID PANEL: CPT

## 2021-02-18 PROCEDURE — 83721 ASSAY OF BLOOD LIPOPROTEIN: CPT

## 2021-02-18 PROCEDURE — 83036 HEMOGLOBIN GLYCOSYLATED A1C: CPT

## 2021-02-18 PROCEDURE — 85025 COMPLETE CBC W/AUTO DIFF WBC: CPT

## 2021-02-18 PROCEDURE — 36415 COLL VENOUS BLD VENIPUNCTURE: CPT

## 2021-02-18 PROCEDURE — 84443 ASSAY THYROID STIM HORMONE: CPT

## 2021-02-23 ENCOUNTER — HOSPITAL ENCOUNTER (EMERGENCY)
Dept: HOSPITAL 76 - ED | Age: 46
Discharge: HOME | End: 2021-02-23
Payer: COMMERCIAL

## 2021-02-23 VITALS — DIASTOLIC BLOOD PRESSURE: 67 MMHG | SYSTOLIC BLOOD PRESSURE: 140 MMHG

## 2021-02-23 DIAGNOSIS — N41.9: ICD-10-CM

## 2021-02-23 DIAGNOSIS — E11.9: ICD-10-CM

## 2021-02-23 DIAGNOSIS — Z90.49: ICD-10-CM

## 2021-02-23 DIAGNOSIS — R10.11: ICD-10-CM

## 2021-02-23 DIAGNOSIS — R10.13: ICD-10-CM

## 2021-02-23 DIAGNOSIS — R11.0: Primary | ICD-10-CM

## 2021-02-23 DIAGNOSIS — Z79.84: ICD-10-CM

## 2021-02-23 DIAGNOSIS — T36.8X5A: ICD-10-CM

## 2021-02-23 DIAGNOSIS — R14.0: ICD-10-CM

## 2021-02-23 LAB
ALBUMIN DIAFP-MCNC: 4.7 G/DL (ref 3.2–5.5)
ALBUMIN/GLOB SERPL: 2.1 {RATIO} (ref 1–2.2)
ALP SERPL-CCNC: 52 IU/L (ref 42–121)
ALT SERPL W P-5'-P-CCNC: 18 IU/L (ref 10–60)
ANION GAP SERPL CALCULATED.4IONS-SCNC: 11 MMOL/L (ref 6–13)
AST SERPL W P-5'-P-CCNC: 22 IU/L (ref 10–42)
BASOPHILS NFR BLD AUTO: 0 10^3/UL (ref 0–0.1)
BASOPHILS NFR BLD AUTO: 0.4 %
BILIRUB BLD-MCNC: 1.7 MG/DL (ref 0.2–1)
BUN SERPL-MCNC: 12 MG/DL (ref 6–20)
CALCIUM UR-MCNC: 9.6 MG/DL (ref 8.5–10.3)
CHLORIDE SERPL-SCNC: 100 MMOL/L (ref 101–111)
CLARITY UR REFRACT.AUTO: CLEAR
CO2 SERPL-SCNC: 24 MMOL/L (ref 21–32)
CREAT SERPLBLD-SCNC: 1 MG/DL (ref 0.6–1.2)
EOSINOPHIL # BLD AUTO: 0 10^3/UL (ref 0–0.7)
EOSINOPHIL NFR BLD AUTO: 0.8 %
ERYTHROCYTE [DISTWIDTH] IN BLOOD BY AUTOMATED COUNT: 12.6 % (ref 12–15)
GLOBULIN SER-MCNC: 2.2 G/DL (ref 2.1–4.2)
GLUCOSE SERPL-MCNC: 109 MG/DL (ref 70–100)
GLUCOSE UR QL STRIP.AUTO: NEGATIVE MG/DL
HGB UR QL STRIP: 14.1 G/DL (ref 14–18)
KETONES UR QL STRIP.AUTO: NEGATIVE MG/DL
LIPASE SERPL-CCNC: 29 U/L (ref 22–51)
LYMPHOCYTES # SPEC AUTO: 1.5 10^3/UL (ref 1.5–3.5)
LYMPHOCYTES NFR BLD AUTO: 31 %
MCH RBC QN AUTO: 30.9 PG (ref 27–31)
MCHC RBC AUTO-ENTMCNC: 34.4 G/DL (ref 32–36)
MCV RBC AUTO: 89.7 FL (ref 80–94)
MONOCYTES # BLD AUTO: 0.4 10^3/UL (ref 0–1)
MONOCYTES NFR BLD AUTO: 8.8 %
MUCOUS THREADS URNS QL MICRO: (no result)
NEUTROPHILS # BLD AUTO: 2.8 10^3/UL (ref 1.5–6.6)
NEUTROPHILS # SNV AUTO: 4.8 X10^3/UL (ref 4.8–10.8)
NEUTROPHILS NFR BLD AUTO: 59 %
NITRITE UR QL STRIP.AUTO: NEGATIVE
PDW BLD AUTO: 9.8 FL (ref 7.4–11.4)
PH UR STRIP.AUTO: 6 PH (ref 5–7.5)
PLATELET # BLD: 179 10^3/UL (ref 130–450)
PROT SPEC-MCNC: 6.9 G/DL (ref 6.7–8.2)
PROT UR STRIP.AUTO-MCNC: 30 MG/DL
RBC # UR STRIP.AUTO: NEGATIVE /UL
RBC # URNS HPF: (no result) /HPF (ref 0–5)
RBC MAR: 4.57 10^6/UL (ref 4.7–6.1)
SP GR UR STRIP.AUTO: >=1.03 (ref 1–1.03)
SPERM # UR AUTO: PRESENT /UL
SQUAMOUS URNS QL MICRO: (no result)
UROBILINOGEN UR QL STRIP.AUTO: 2 E.U./DL
UROBILINOGEN UR STRIP.AUTO-MCNC: NEGATIVE MG/DL

## 2021-02-23 PROCEDURE — 81003 URINALYSIS AUTO W/O SCOPE: CPT

## 2021-02-23 PROCEDURE — 87086 URINE CULTURE/COLONY COUNT: CPT

## 2021-02-23 PROCEDURE — 99284 EMERGENCY DEPT VISIT MOD MDM: CPT

## 2021-02-23 PROCEDURE — 83690 ASSAY OF LIPASE: CPT

## 2021-02-23 PROCEDURE — 76705 ECHO EXAM OF ABDOMEN: CPT

## 2021-02-23 PROCEDURE — 36415 COLL VENOUS BLD VENIPUNCTURE: CPT

## 2021-02-23 PROCEDURE — 85025 COMPLETE CBC W/AUTO DIFF WBC: CPT

## 2021-02-23 PROCEDURE — 99283 EMERGENCY DEPT VISIT LOW MDM: CPT

## 2021-02-23 PROCEDURE — 80053 COMPREHEN METABOLIC PANEL: CPT

## 2021-02-23 PROCEDURE — 81001 URINALYSIS AUTO W/SCOPE: CPT

## 2021-02-23 NOTE — ULTRASOUND REPORT
PROCEDURE: Abdomen Limited

 

INDICATIONS:  RUQ abd pain, nausea; prior CCY.

 

TECHNIQUE:  

Real-time focused scanning was performed of the abdomen, with image documentation.  

 

COMPARISON:  None

 

FINDINGS:  Examination is limited by body habitus and bowel gas. Liver is grossly unremarkable. Statu
s post cholecystectomy. No biliary ductal dilatation. Pancreas is not seen. Right kidney is within no
rmal limits as visualized. No definite hydronephrosis. Nonvisualization of the aorta, IVC, and iliac 
arteries.

 

IMPRESSION:  

Limited examination demonstrating no acute process.

 

Reviewed by: Tara Washington MD on 2/23/2021 4:51 PM PST

Approved by: Tara Washington MD on 2/23/2021 4:51 PM PST

 

 

Station ID:  [id number]

## 2021-02-23 NOTE — ED PHYSICIAN DOCUMENTATION
PD HPI DYSPNEA





- Stated complaint


Stated Complaint: WEAKNESS





- Chief complaint


Chief Complaint: General





- History obtained from


History obtained from: Patient





- History of Present Illness


Timing - onset: How many days ago (2)


Timing - onset during: Light activity


Timing - details: Gradual onset, Still present


Inciting event(s): Other (he started with feeling dyspnea, anxious, nausea, 

bloating, and with lots of belching and flatulence 2 days ago. Has been on 

Bactrim for 5 days for dysuria/frequency and Dx with prostatitis. Dysuria 

improving.).  No: Out of meds, URI


Improved by: Sitting up, Other (belching)


Worsened by: Laying flat, Other (eating)


Associated symptoms: No: Fever, Cough, Wheezing, Palpitations, Bilateral edema


Similar symptoms before: Has not had sx before


Recently seen: Clinic (was having frequency and dysuria, Rx with Bactrim. No 

prior similar episodes.)





Review of Systems


Constitutional: denies: Fever, Chills


Nose: denies: Rhinorrhea / runny nose, Congestion


Throat: denies: Sore throat


Cardiac: denies: Chest pain / pressure, Palpitations


Respiratory: reports: Dyspnea.  denies: Cough, Wheezing


GI: reports: Nausea.  denies: Vomiting, Diarrhea (but lots of gas)


: reports: Dysuria, Frequency.  denies: Discharge


Skin: reports: Other (denies itching.).  denies: Rash, Lesions


Musculoskeletal: denies: Neck pain, Back pain





PD PAST MEDICAL HISTORY





- Past Medical History


Cardiovascular: None


Respiratory: None


Endocrine/Autoimmune: Type 2 diabetes


GI: None


: None


HEENT: None


Psych: Depression


Musculoskeletal: None


Derm: None





- Past Surgical History


Past Surgical History: No


General: Cholecystectomy, Gastric surgery





- Present Medications


Home Medications: 


                                Ambulatory Orders











 Medication  Instructions  Recorded  Confirmed


 


Metformin HCl 500 mg PO DAILY 03/15/15 06/25/17


 


Buspirone HCl 7.5 mg PO DAILY 06/25/17 06/25/17


 


Cyclobenzaprine [Flexeril] 10 mg PO TID PRN 06/25/17 06/25/17


 


Oxycodone HCl/Acetaminophen 1 - 2 each PO Q6H PRN #14 tablet 06/25/17 





[Percocet 5-325 mg Tablet]   


 


Sertraline HCl 100 mg PO DAILY 06/25/17 06/25/17


 


predniSONE [Deltasone] 0 mg PO DAILY 06/25/17 06/25/17


 


L.acid/L.casei/B.bif/B.desiree/Fos 1 each PO BID #20 cap 02/23/21 





[Probiotic Blend Capsule]   


 


LORazepam [Ativan] 1 mg PO BID PRN #8 tab 02/23/21 


 


Ondansetron Odt [Zofran] 4 mg TL Q6H PRN #10 tab 02/23/21 


 


Simethicone 180 mg PO TID PRN #15 cap 02/23/21 


 


levoFLOXacin [Levaquin] 250 mg PO QD #10 tab 02/23/21 














- Allergies


Allergies/Adverse Reactions: 


                                    Allergies











Allergy/AdvReac Type Severity Reaction Status Date / Time


 


No Known Drug Allergies Allergy   Verified 02/23/21 14:20














- Social History


Does the pt smoke?: No


Smoking Status: Never smoker


Does the pt drink ETOH?: No


Does the pt have substance abuse?: No





- Immunizations


Immunizations are current?: Yes





- POLST


Patient has POLST: No





PD ED PE NORMAL





- Vitals


Vital signs reviewed: Yes





- General


General: Alert and oriented X 3, No acute distress (seems anxious), Well 

developed/nourished





- HEENT


HEENT: Pharynx benign (no oral edema/swelling)





- Neck


Neck: Supple, no meningeal sign, No adenopathy





- Cardiac


Cardiac: RRR, No murmur





- Respiratory


Respiratory: No respiratory distress, Clear bilaterally





- Abdomen


Abdomen: Soft, Non distended, No organomegaly, Other (tender RUQ and epigastric 

area. ).  No: Normal bowel sounds (increased sounds)





- Male 


Male : Deferred





- Rectal


Rectal: Deferred





- Back


Back: No CVA TTP





- Derm


Derm: Normal color, Warm and dry, No rash





- Neuro


Neuro: Alert and oriented X 3, No motor deficit, Normal speech





Results





- Vitals


Vitals: 


                               Vital Signs - 24 hr











  02/23/21 02/23/21 02/23/21





  14:22 15:46 17:23


 


Temperature 36.8 C  


 


Heart Rate 85 65 64


 


Respiratory 18 19 20





Rate   


 


Blood Pressure 122/75 115/79 109/68


 


O2 Saturation 99  100














  02/23/21





  18:11


 


Temperature 


 


Heart Rate 85


 


Respiratory 19





Rate 


 


Blood Pressure 140/67 H


 


O2 Saturation 100








                                     Oxygen











O2 Source                      Room air

















- Labs


Labs: 


                                Laboratory Tests











  02/23/21 02/23/21 02/23/21





  14:29 14:55 15:30


 


WBC    4.8


 


RBC    4.57 L


 


Hgb    14.1


 


Hct    41.0 L


 


MCV    89.7


 


MCH    30.9


 


MCHC    34.4


 


RDW    12.6


 


Plt Count    179


 


MPV    9.8


 


Neut # (Auto)    2.8


 


Lymph # (Auto)    1.5


 


Mono # (Auto)    0.4


 


Eos # (Auto)    0.0


 


Baso # (Auto)    0.0


 


Absolute Nucleated RBC    0.00


 


Nucleated RBC %    0.0


 


Sodium   


 


Potassium   


 


Chloride   


 


Carbon Dioxide   


 


Anion Gap   


 


BUN   


 


Creatinine   


 


Estimated GFR (MDRD)   


 


Glucose   


 


POC Whole Bld Glucose  129 H  


 


Calcium   


 


Total Bilirubin   


 


AST   


 


ALT   


 


Alkaline Phosphatase   


 


Total Protein   


 


Albumin   


 


Globulin   


 


Albumin/Globulin Ratio   


 


Lipase   


 


Urine Color   YELLOW 


 


Urine Clarity   CLEAR 


 


Urine pH   6.0 


 


Ur Specific Gravity   >=1.030 H 


 


Urine Protein   30 H 


 


Urine Glucose (UA)   NEGATIVE 


 


Urine Ketones   NEGATIVE 


 


Urine Occult Blood   NEGATIVE 


 


Urine Nitrite   NEGATIVE 


 


Urine Bilirubin   NEGATIVE 


 


Urine Urobilinogen   2 H 


 


Ur Leukocyte Esterase   NEGATIVE 


 


Urine RBC   0-5 


 


Urine WBC   0-3 


 


Ur Squamous Epith Cells   RARE Squamous 


 


Urine Bacteria   None Seen 


 


Urine Mucus   Few Strands 


 


Urine Sperm   PRESENT 


 


Ur Microscopic Review   INDICATED 


 


Urine Culture Comments   NOT INDICATED 














  02/23/21





  15:30


 


WBC 


 


RBC 


 


Hgb 


 


Hct 


 


MCV 


 


MCH 


 


MCHC 


 


RDW 


 


Plt Count 


 


MPV 


 


Neut # (Auto) 


 


Lymph # (Auto) 


 


Mono # (Auto) 


 


Eos # (Auto) 


 


Baso # (Auto) 


 


Absolute Nucleated RBC 


 


Nucleated RBC % 


 


Sodium  135


 


Potassium  4.3


 


Chloride  100 L


 


Carbon Dioxide  24


 


Anion Gap  11.0


 


BUN  12


 


Creatinine  1.0


 


Estimated GFR (MDRD)  80 L


 


Glucose  109 H


 


POC Whole Bld Glucose 


 


Calcium  9.6


 


Total Bilirubin  1.7 H


 


AST  22


 


ALT  18


 


Alkaline Phosphatase  52


 


Total Protein  6.9


 


Albumin  4.7


 


Globulin  2.2


 


Albumin/Globulin Ratio  2.1


 


Lipase  29


 


Urine Color 


 


Urine Clarity 


 


Urine pH 


 


Ur Specific Gravity 


 


Urine Protein 


 


Urine Glucose (UA) 


 


Urine Ketones 


 


Urine Occult Blood 


 


Urine Nitrite 


 


Urine Bilirubin 


 


Urine Urobilinogen 


 


Ur Leukocyte Esterase 


 


Urine RBC 


 


Urine WBC 


 


Ur Squamous Epith Cells 


 


Urine Bacteria 


 


Urine Mucus 


 


Urine Sperm 


 


Ur Microscopic Review 


 


Urine Culture Comments 














- Rads (name of study)


  ** abd U/S


Radiology: Prelim report reviewed, See rad report, Other (discussed with tech, 

lots of gas so partly obscured. Visible CBD is normal. kidney okay. pancreat not

 really seen. )





PD MEDICAL DECISION MAKING





- ED course


Complexity details: reviewed results, re-evaluated patient (improved with meds. 

), considered differential (timing would suggest side effects of Bactrim. Not 

seeming histamine mediated allergy. pain and nausea could suggest blocked CBD 

(s/p CCY). can get labs and U/S. Given IV fluids/meds. ), d/w patient





Departure





- Departure


Disposition: 01 Home, Self Care


Clinical Impression: 


 Side effect of medication, Abdominal cramping, Nausea





Condition: Stable


Record reviewed to determine appropriate education?: Yes


Instructions:  ED Nausea Vomiting


Follow-Up: 


Jacinto Smith DO [Primary Care Provider] - 


Prescriptions: 


LORazepam [Ativan] 1 mg PO BID PRN #8 tab


 PRN Reason: Anxiety


levoFLOXacin [Levaquin] 250 mg PO QD #10 tab


L.acid/L.casei/B.bif/B.desiree/Fos [Probiotic Blend Capsule] 1 each PO BID #20 cap


Simethicone 180 mg PO TID PRN #15 cap


 PRN Reason: Gas


Ondansetron Odt [Zofran] 4 mg TL Q6H PRN #10 tab


 PRN Reason: Nausea / Vomiting


Comments: 


This would seem your symptoms are side effects of the antibiotic.  No signs of 

urinary retention nor renal failure and your liver function tests and 

electrolytes are good.  Ultrasound did not show any obvious dilatation of the 

bile duct.  He did have a lot of gas which obscured the view a little bit.





I would stop the Bactrim antibiotic.  Stay well-hydrated.  Use simethicone gas 

reducing medication and you could use antacids as well.





Ondansetron if needed for nausea.  You could use lorazepam if needed for anxiety

 in the short-term presuming the symptom will decrease over the next day or 2 

off of the medicine.





You can use the phenazopyridine (Pyridium) for decreasing the urinary symptoms 

and urgency.  I would wait a day or so to make sure you are feeling little bit 

better and then start levofloxacin as the next antibiotic of choice for the 

prostatitis.





Follow-up with your primary care in the next several days, call for an 

appointment.





Also add a probiotic to help with resuming balance of the intestinal araceli.


Discharge Date/Time: 02/23/21 18:09

## 2022-06-15 ENCOUNTER — HOSPITAL ENCOUNTER (OUTPATIENT)
Dept: HOSPITAL 76 - LAB.N | Age: 47
Discharge: HOME | End: 2022-06-15
Attending: NURSE PRACTITIONER
Payer: COMMERCIAL

## 2022-06-15 DIAGNOSIS — R36.1: Primary | ICD-10-CM

## 2022-06-15 LAB
CLARITY UR REFRACT.AUTO: (no result)
GLUCOSE UR QL STRIP.AUTO: 100 MG/DL
KETONES UR QL STRIP.AUTO: NEGATIVE MG/DL
NITRITE UR QL STRIP.AUTO: NEGATIVE
PH UR STRIP.AUTO: 6 PH (ref 5–7.5)
PROT UR STRIP.AUTO-MCNC: (no result) MG/DL
RBC # UR STRIP.AUTO: NEGATIVE /UL
SP GR UR STRIP.AUTO: >=1.03 (ref 1–1.03)
SQUAMOUS URNS QL MICRO: (no result)
UROBILINOGEN UR QL STRIP.AUTO: (no result) E.U./DL
UROBILINOGEN UR STRIP.AUTO-MCNC: NEGATIVE MG/DL
WBC # UR MANUAL: (no result) /HPF (ref 0–3)

## 2022-06-15 PROCEDURE — 87086 URINE CULTURE/COLONY COUNT: CPT

## 2022-06-15 PROCEDURE — 81001 URINALYSIS AUTO W/SCOPE: CPT

## 2022-07-01 ENCOUNTER — HOSPITAL ENCOUNTER (OUTPATIENT)
Dept: HOSPITAL 76 - LAB.N | Age: 47
Discharge: HOME | End: 2022-07-01
Attending: PHYSICIAN ASSISTANT
Payer: COMMERCIAL

## 2022-07-01 DIAGNOSIS — E11.9: Primary | ICD-10-CM

## 2022-07-01 LAB
ALBUMIN DIAFP-MCNC: 4.6 G/DL (ref 3.2–5.5)
ALBUMIN/GLOB SERPL: 1.9 {RATIO} (ref 1–2.2)
ALP SERPL-CCNC: 53 IU/L (ref 42–121)
ALT SERPL W P-5'-P-CCNC: 19 IU/L (ref 10–60)
ANION GAP SERPL CALCULATED.4IONS-SCNC: 9 MMOL/L (ref 6–13)
AST SERPL W P-5'-P-CCNC: 27 IU/L (ref 10–42)
BASOPHILS NFR BLD AUTO: 0 10^3/UL (ref 0–0.1)
BASOPHILS NFR BLD AUTO: 0.6 %
BILIRUB BLD-MCNC: 2.6 MG/DL (ref 0.2–1)
BUN SERPL-MCNC: 16 MG/DL (ref 6–20)
CALCIUM UR-MCNC: 9.4 MG/DL (ref 8.5–10.3)
CHLORIDE SERPL-SCNC: 101 MMOL/L (ref 101–111)
CHOLEST SERPL-MCNC: 171 MG/DL
CO2 SERPL-SCNC: 28 MMOL/L (ref 21–32)
CREAT SERPLBLD-SCNC: 0.9 MG/DL (ref 0.6–1.2)
CREAT UR-SCNC: 193 MG/DL
EOSINOPHIL # BLD AUTO: 0 10^3/UL (ref 0–0.7)
EOSINOPHIL NFR BLD AUTO: 0.7 %
ERYTHROCYTE [DISTWIDTH] IN BLOOD BY AUTOMATED COUNT: 12.6 % (ref 12–15)
EST. AVERAGE GLUCOSE BLD GHB EST-MCNC: 114 MG/DL (ref 70–100)
GFRSERPLBLD MDRD-ARVRAT: 90 ML/MIN/{1.73_M2} (ref 89–?)
GLOBULIN SER-MCNC: 2.4 G/DL (ref 2.1–4.2)
GLUCOSE SERPL-MCNC: 83 MG/DL (ref 70–100)
HBA1C MFR BLD HPLC: 5.6 % (ref 4.27–6.07)
HCT VFR BLD AUTO: 42.2 % (ref 42–52)
HDLC SERPL-MCNC: 39 MG/DL
HDLC SERPL: 4.4 {RATIO} (ref ?–5)
HGB UR QL STRIP: 14 G/DL (ref 14–18)
LDLC SERPL CALC-MCNC: 121 MG/DL
LDLC/HDLC SERPL: 3.1 {RATIO} (ref ?–3.6)
LYMPHOCYTES # SPEC AUTO: 1.3 10^3/UL (ref 1.5–3.5)
LYMPHOCYTES NFR BLD AUTO: 24.5 %
MCH RBC QN AUTO: 30.4 PG (ref 27–31)
MCHC RBC AUTO-ENTMCNC: 33.2 G/DL (ref 32–36)
MCV RBC AUTO: 91.5 FL (ref 80–94)
MICROALBUMIN UR-MCNC: 15.3 MG/DL (ref 0–300)
MICROALBUMIN/CREAT RATIO PNL UR: 79.3 UG/MG (ref ?–30)
MONOCYTES # BLD AUTO: 0.6 10^3/UL (ref 0–1)
MONOCYTES NFR BLD AUTO: 11.2 %
NEUTROPHILS # BLD AUTO: 3.4 10^3/UL (ref 1.5–6.6)
NEUTROPHILS # SNV AUTO: 5.3 X10^3/UL (ref 4.8–10.8)
NEUTROPHILS NFR BLD AUTO: 62.8 %
NRBC # BLD AUTO: 0 /100WBC
NRBC # BLD AUTO: 0 X10^3/UL
PDW BLD AUTO: 11.1 FL (ref 7.4–11.4)
PLATELET # BLD: 214 10^3/UL (ref 130–450)
POTASSIUM SERPL-SCNC: 4.1 MMOL/L (ref 3.5–5)
PROT SPEC-MCNC: 7 G/DL (ref 6.7–8.2)
RBC MAR: 4.61 10^6/UL (ref 4.7–6.1)
SODIUM SERPLBLD-SCNC: 138 MMOL/L (ref 135–145)
TRIGL P FAST SERPL-MCNC: 56 MG/DL
VLDLC SERPL-SCNC: 11 MG/DL

## 2022-07-01 PROCEDURE — 83036 HEMOGLOBIN GLYCOSYLATED A1C: CPT

## 2022-07-01 PROCEDURE — 80061 LIPID PANEL: CPT

## 2022-07-01 PROCEDURE — 36415 COLL VENOUS BLD VENIPUNCTURE: CPT

## 2022-07-01 PROCEDURE — 80053 COMPREHEN METABOLIC PANEL: CPT

## 2022-07-01 PROCEDURE — 83721 ASSAY OF BLOOD LIPOPROTEIN: CPT

## 2022-07-01 PROCEDURE — 82043 UR ALBUMIN QUANTITATIVE: CPT

## 2022-07-01 PROCEDURE — 85025 COMPLETE CBC W/AUTO DIFF WBC: CPT

## 2022-07-01 PROCEDURE — 82570 ASSAY OF URINE CREATININE: CPT

## 2023-04-21 ENCOUNTER — HOSPITAL ENCOUNTER (OUTPATIENT)
Dept: HOSPITAL 76 - DI | Age: 48
Discharge: HOME | End: 2023-04-21
Attending: FAMILY MEDICINE
Payer: COMMERCIAL

## 2023-04-21 ENCOUNTER — HOSPITAL ENCOUNTER (OUTPATIENT)
Dept: HOSPITAL 76 - LAB.N | Age: 48
Discharge: HOME | End: 2023-04-21
Attending: FAMILY MEDICINE
Payer: COMMERCIAL

## 2023-04-21 DIAGNOSIS — Z98.84: ICD-10-CM

## 2023-04-21 DIAGNOSIS — R10.31: Primary | ICD-10-CM

## 2023-04-21 DIAGNOSIS — R10.11: ICD-10-CM

## 2023-04-21 LAB
ALBUMIN DIAFP-MCNC: 4.3 G/DL (ref 3.2–5.5)
ALBUMIN/GLOB SERPL: 1.6 {RATIO} (ref 1–2.2)
ALP SERPL-CCNC: 58 IU/L (ref 42–121)
ALT SERPL W P-5'-P-CCNC: 22 IU/L (ref 10–60)
ANION GAP SERPL CALCULATED.4IONS-SCNC: 5 MMOL/L (ref 6–13)
AST SERPL W P-5'-P-CCNC: 21 IU/L (ref 10–42)
BASOPHILS NFR BLD AUTO: 0 10^3/UL (ref 0–0.1)
BASOPHILS NFR BLD AUTO: 0.4 %
BILIRUB BLD-MCNC: 1.7 MG/DL (ref 0.2–1)
BUN SERPL-MCNC: 19 MG/DL (ref 6–20)
CALCIUM UR-MCNC: 9.5 MG/DL (ref 8.5–10.3)
CHLORIDE SERPL-SCNC: 108 MMOL/L (ref 101–111)
CO2 SERPL-SCNC: 30 MMOL/L (ref 21–32)
CREAT SERPLBLD-SCNC: 0.9 MG/DL (ref 0.6–1.2)
EOSINOPHIL # BLD AUTO: 0 10^3/UL (ref 0–0.7)
EOSINOPHIL NFR BLD AUTO: 0.6 %
ERYTHROCYTE [DISTWIDTH] IN BLOOD BY AUTOMATED COUNT: 12.7 % (ref 12–15)
GFRSERPLBLD MDRD-ARVRAT: 90 ML/MIN/{1.73_M2} (ref 89–?)
GLOBULIN SER-MCNC: 2.7 G/DL (ref 2.1–4.2)
GLUCOSE SERPL-MCNC: 95 MG/DL (ref 70–100)
HCT VFR BLD AUTO: 44 % (ref 42–52)
HGB UR QL STRIP: 15 G/DL (ref 14–18)
LIPASE SERPL-CCNC: 42 U/L (ref 22–51)
LYMPHOCYTES # SPEC AUTO: 1.2 10^3/UL (ref 1.5–3.5)
LYMPHOCYTES NFR BLD AUTO: 24 %
MCH RBC QN AUTO: 31 PG (ref 27–31)
MCHC RBC AUTO-ENTMCNC: 34.1 G/DL (ref 32–36)
MCV RBC AUTO: 90.9 FL (ref 80–94)
MONOCYTES # BLD AUTO: 0.5 10^3/UL (ref 0–1)
MONOCYTES NFR BLD AUTO: 9.3 %
NEUTROPHILS # BLD AUTO: 3.3 10^3/UL (ref 1.5–6.6)
NEUTROPHILS # SNV AUTO: 5 X10^3/UL (ref 4.8–10.8)
NEUTROPHILS NFR BLD AUTO: 65.5 %
NRBC # BLD AUTO: 0 /100WBC
NRBC # BLD AUTO: 0 X10^3/UL
PDW BLD AUTO: 10.8 FL (ref 7.4–11.4)
PLATELET # BLD: 230 10^3/UL (ref 130–450)
POTASSIUM SERPL-SCNC: 4.4 MMOL/L (ref 3.5–5)
PROT SPEC-MCNC: 7 G/DL (ref 6.7–8.2)
RBC MAR: 4.84 10^6/UL (ref 4.7–6.1)
SODIUM SERPLBLD-SCNC: 143 MMOL/L (ref 135–145)

## 2023-04-21 PROCEDURE — 83690 ASSAY OF LIPASE: CPT

## 2023-04-21 PROCEDURE — 85025 COMPLETE CBC W/AUTO DIFF WBC: CPT

## 2023-04-21 PROCEDURE — 80053 COMPREHEN METABOLIC PANEL: CPT

## 2023-04-21 PROCEDURE — 36415 COLL VENOUS BLD VENIPUNCTURE: CPT

## 2023-04-21 PROCEDURE — 74177 CT ABD & PELVIS W/CONTRAST: CPT

## 2023-04-22 NOTE — CT REPORT
PROCEDURE:  ABDOMEN/PELVIS W

 

INDICATIONS:  RLQ ABDOMEN PAIN, RUQ PAIN

 

CONTRAST:  100ml omni 300 

 

TECHNIQUE: 

After the administration of oral and intravenous contrast, 5 mm thick sections acquired from the diap
hragms to the symphysis.  5 mm thick coronal and sagittal reformats were acquired.  For radiation dos
e reduction, the following was used:  automated exposure control, adjustment of mA and/or kV accordin
g to patient size.  

 

COMPARISON:  CT abdomen and pelvis with, 11/25/2015

 

FINDINGS:  

Image quality: Excellent.

 

Lung bases and heart: Unremarkable. 

 

Liver: Unremarkable.

Gallbladder and biliary tree: Gallbladder is surgically removed. No intrahepatic or extrahepatic bili
ajit dilation.

Spleen: Unremarkable.

Pancreas: Unremarkable.

Adrenals: Unremarkable.

Kidneys and ureters: Unremarkable. 

 

Bowel and peritoneum: Gastric bypass. No bowel distension. No pathologic free fluid. Appendix is norm
al. Moderate amount of stool in colon.

Lymph nodes: No central or retroperitoneal adenopathy.

Vessels: Unremarkable.

 

PELVIS

Reproductive organs: Unremarkable.

Bladder: Unremarkable.

Lymph nodes: Unremarkable.

Bones: No aggressive osseous abnormality. Degenerative and postsurgical changes in lumbar spine.

 

 

IMPRESSION:  

 

1. Normal appendix. A cause for right lower quadrant pain is not identified. 

2. Moderate amount stool in colon.

3. Gastric bypass.

 

Reviewed by: Kisha Cohen MD on 4/22/2023 1:14 PM PDT

Approved by: Kisha Cohen MD on 4/22/2023 1:14 PM PDT

 

 

Station ID:  [id number]

## 2023-09-16 ENCOUNTER — HOSPITAL ENCOUNTER (OUTPATIENT)
Dept: HOSPITAL 76 - LAB.N | Age: 48
Discharge: HOME | End: 2023-09-16
Attending: NURSE PRACTITIONER
Payer: COMMERCIAL

## 2023-09-16 DIAGNOSIS — L02.91: Primary | ICD-10-CM

## 2023-09-16 PROCEDURE — 87070 CULTURE OTHR SPECIMN AEROBIC: CPT

## 2023-09-16 PROCEDURE — 87205 SMEAR GRAM STAIN: CPT
